# Patient Record
(demographics unavailable — no encounter records)

---

## 2024-10-23 NOTE — ADDENDUM
[FreeTextEntry1] :  I, Lola Montague ([facility]) assisted in filling out this chart under the dictation of Lalo Garrett on 10/21/2024

## 2024-10-23 NOTE — PHYSICAL EXAM
[FreeTextEntry1] : General: Awake, Alert, No Acute Distress Respiratory: Normal Respiratory Effort Rectal: External examination shows no significant abnormalities.

## 2024-10-23 NOTE — ASSESSMENT
[FreeTextEntry1] : 66-year-old female with bleeding grade 1 hemorrhoids, s/p banding.   I spoke to the patient regarding her condition. She'll contact our office for complications, including bleeding or infection. We will see her back in one month.

## 2024-10-23 NOTE — ADDENDUM
[FreeTextEntry1] :  I, Lola Montague ([facility]) assisted in filling out this chart under the dictation of Lalo Garrett on 10/21/2024

## 2024-10-23 NOTE — PROCEDURE
[FreeTextEntry1] : Digital rectal exam and anoscopy shows normal sphincter tone, large internal hemorrhoids, particularly in the right posterior lateral location, and no masses.   I spoke to the patient regarding hemorrhoidal banding. All risks, benefits, and alternatives were discussed. Patient was in agreement with the plan and signed surgical consent.   Patient was placed in the prone jackknife position on the table. An anoscope was used to evaluate the hemorrhoid tissue. Large friable hemorrhoids were found in the right posterolateral location. Using a disposable suction hemorrhoidal , one band was then placed in this location. This produced some minor self-limited bleeding. The patient tolerated the procedure well with minimal pain and no vasovagal response.

## 2024-10-23 NOTE — END OF VISIT
[FreeTextEntry3] : Documented by Lola Montague acting as a scribe for Lalo Garrett on 10/21/2024   All medical record entries made by the Scribe were at my, Dr. Lalo Garrett direction and personally dictated by me on 10/21/2024 . I have reviewed the chart and agree that the record accurately reflects my personal performance of the history, physical exam, assessment and plan. I have also personally directed, reviewed, and agreed with the chart.

## 2024-10-23 NOTE — HISTORY OF PRESENT ILLNESS
[FreeTextEntry1] : Patient is a 66-year-old females with a PMHx of osteoporosis, waldenstrom's macroglobulinemia, monitored by Dr. Salas, PSHx of hysterectomy, appendectomy, and tonsillectomy, who presents for hemorrhoidal banding. Patient reports undergoing a colonoscopy in January 2024 by Dr. Velazquez, in [place]. At that time, she was found to have two small polyps and diverticulosis. She reports since that procedure having bleeding with her bowel movements. On the patient's last office visit, she is found to have large right posterior lateral internal hemorrhoids and recommends undergo hemorrhoidal banding. She presents today for the procedure. She reports daily bowel movements with the use of Benefiber. She denies prolapse. She denies recent fevers, chills, nausea, or vomiting. She denies a family history of colon cancer, rectal cancer, or inflammatory bowel disease.

## 2024-11-14 NOTE — ADDENDUM
[FreeTextEntry1] :  I, Lola Montague ([facility]) assisted in filling out this chart under the dictation of Lalo Garrett on 11/14/2024

## 2024-11-14 NOTE — HISTORY OF PRESENT ILLNESS
[FreeTextEntry1] : Patient is a 67-year-old female with a PMHx of osteoporosis, waldenstrom's macroglobulinemia, monitored by Dr. Salas, PSHx of hysterectomy, appendectomy, and tonsillectomy, who presents s/p banding on her last office visit. The patient reports since her last visit, she's had complete resolution of her bleeding with her bowel movements. She reports daily bowel movements with the use of Benefiber. She denies prolapse. She denies recent fevers, chills, nausea, or vomiting. She denies a family history of colon cancer, rectal cancer, or inflammatory bowel disease. Patients last colonoscopy wad in January 2024 with Dr. Velazquez in [place], which showed two small polyps and diverticulosis.

## 2024-11-14 NOTE — ASSESSMENT
[FreeTextEntry1] : 67-year-old female with bleeding grade one hemorrhoids, s/p banding.   I spoke to the patient regarding her condition. I recommend she continue with a high-fiber diet and fiber supplementation. She'll contact our office for recurrent symptoms. We'll see her back as needed.